# Patient Record
Sex: MALE | Race: WHITE | NOT HISPANIC OR LATINO | Employment: STUDENT | URBAN - METROPOLITAN AREA
[De-identification: names, ages, dates, MRNs, and addresses within clinical notes are randomized per-mention and may not be internally consistent; named-entity substitution may affect disease eponyms.]

---

## 2017-11-27 DIAGNOSIS — Z52.001 DONOR OF STEM CELL: Primary | ICD-10-CM

## 2017-11-29 ENCOUNTER — HOSPITAL ENCOUNTER (OUTPATIENT)
Dept: LAB | Facility: CLINIC | Age: 22
Discharge: HOME | End: 2017-11-29
Attending: INTERNAL MEDICINE | Admitting: INTERNAL MEDICINE
Payer: COMMERCIAL

## 2017-11-29 ENCOUNTER — OFFICE VISIT (OUTPATIENT)
Dept: TRANSPLANT | Facility: CLINIC | Age: 22
End: 2017-11-29
Attending: INTERNAL MEDICINE
Payer: COMMERCIAL

## 2017-11-29 VITALS
RESPIRATION RATE: 18 BRPM | WEIGHT: 135.8 LBS | BODY MASS INDEX: 20.11 KG/M2 | HEIGHT: 69 IN | SYSTOLIC BLOOD PRESSURE: 133 MMHG | OXYGEN SATURATION: 98 % | DIASTOLIC BLOOD PRESSURE: 71 MMHG | TEMPERATURE: 98.7 F | HEART RATE: 71 BPM

## 2017-11-29 VITALS
SYSTOLIC BLOOD PRESSURE: 108 MMHG | TEMPERATURE: 98.7 F | HEART RATE: 62 BPM | BODY MASS INDEX: 19.76 KG/M2 | HEIGHT: 69 IN | DIASTOLIC BLOOD PRESSURE: 66 MMHG | WEIGHT: 133.38 LBS | RESPIRATION RATE: 18 BRPM

## 2017-11-29 DIAGNOSIS — Z52.001 DONOR OF STEM CELL: ICD-10-CM

## 2017-11-29 DIAGNOSIS — Z52.001 DONOR OF STEM CELL: Primary | ICD-10-CM

## 2017-11-29 LAB
ABO + RH BLD: NORMAL
ABO + RH BLD: NORMAL
ALBUMIN SERPL-MCNC: 3.9 G/DL (ref 3.4–5)
ALBUMIN UR-MCNC: NEGATIVE MG/DL
ALP SERPL-CCNC: 85 U/L (ref 40–150)
ALT SERPL W P-5'-P-CCNC: 22 U/L (ref 0–70)
ANION GAP SERPL CALCULATED.3IONS-SCNC: 6 MMOL/L (ref 3–14)
APPEARANCE UR: CLEAR
APTT PPP: 29 SEC (ref 22–37)
AST SERPL W P-5'-P-CCNC: 22 U/L (ref 0–45)
BASOPHILS # BLD AUTO: 0 10E9/L (ref 0–0.2)
BASOPHILS NFR BLD AUTO: 0.7 %
BILIRUB SERPL-MCNC: 0.9 MG/DL (ref 0.2–1.3)
BILIRUB UR QL STRIP: NEGATIVE
BLD GP AB SCN SERPL QL: NORMAL
BLOOD BANK CMNT PATIENT-IMP: NORMAL
BUN SERPL-MCNC: 17 MG/DL (ref 7–30)
CALCIUM SERPL-MCNC: 9.2 MG/DL (ref 8.5–10.1)
CHLORIDE SERPL-SCNC: 104 MMOL/L (ref 94–109)
CO2 SERPL-SCNC: 29 MMOL/L (ref 20–32)
COLOR UR AUTO: YELLOW
CREAT SERPL-MCNC: 0.74 MG/DL (ref 0.66–1.25)
DIFFERENTIAL METHOD BLD: NORMAL
EOSINOPHIL # BLD AUTO: 0.1 10E9/L (ref 0–0.7)
EOSINOPHIL NFR BLD AUTO: 1.2 %
ERYTHROCYTE [DISTWIDTH] IN BLOOD BY AUTOMATED COUNT: 12.1 % (ref 10–15)
GFR SERPL CREATININE-BSD FRML MDRD: >90 ML/MIN/1.7M2
GLUCOSE SERPL-MCNC: 70 MG/DL (ref 70–99)
GLUCOSE UR STRIP-MCNC: NEGATIVE MG/DL
HCT VFR BLD AUTO: 45.9 % (ref 40–53)
HGB BLD-MCNC: 15.6 G/DL (ref 13.3–17.7)
HGB UR QL STRIP: NEGATIVE
IMM GRANULOCYTES # BLD: 0 10E9/L (ref 0–0.4)
IMM GRANULOCYTES NFR BLD: 0.2 %
INR PPP: 1.15 (ref 0.86–1.14)
KETONES UR STRIP-MCNC: NEGATIVE MG/DL
LDH SERPL L TO P-CCNC: 178 U/L (ref 85–227)
LEUKOCYTE ESTERASE UR QL STRIP: NEGATIVE
LYMPHOCYTES # BLD AUTO: 1.4 10E9/L (ref 0.8–5.3)
LYMPHOCYTES NFR BLD AUTO: 33.7 %
MCH RBC QN AUTO: 32.3 PG (ref 26.5–33)
MCHC RBC AUTO-ENTMCNC: 34 G/DL (ref 31.5–36.5)
MCV RBC AUTO: 95 FL (ref 78–100)
MONOCYTES # BLD AUTO: 0.4 10E9/L (ref 0–1.3)
MONOCYTES NFR BLD AUTO: 9 %
NEUTROPHILS # BLD AUTO: 2.3 10E9/L (ref 1.6–8.3)
NEUTROPHILS NFR BLD AUTO: 55.2 %
NITRATE UR QL: NEGATIVE
NRBC # BLD AUTO: 0 10*3/UL
NRBC BLD AUTO-RTO: 0 /100
PH UR STRIP: 6 PH (ref 5–7)
PLATELET # BLD AUTO: 236 10E9/L (ref 150–450)
POTASSIUM SERPL-SCNC: 4 MMOL/L (ref 3.4–5.3)
PROT SERPL-MCNC: 7.3 G/DL (ref 6.8–8.8)
RBC # BLD AUTO: 4.83 10E12/L (ref 4.4–5.9)
RBC #/AREA URNS AUTO: <1 /HPF (ref 0–2)
SODIUM SERPL-SCNC: 139 MMOL/L (ref 133–144)
SOURCE: NORMAL
SP GR UR STRIP: 1.02 (ref 1–1.03)
SPECIMEN EXP DATE BLD: NORMAL
UROBILINOGEN UR STRIP-MCNC: 0 MG/DL (ref 0–2)
WBC # BLD AUTO: 4.2 10E9/L (ref 4–11)
WBC #/AREA URNS AUTO: 1 /HPF (ref 0–2)

## 2017-11-29 PROCEDURE — 83615 LACTATE (LD) (LDH) ENZYME: CPT | Performed by: INTERNAL MEDICINE

## 2017-11-29 PROCEDURE — 83021 HEMOGLOBIN CHROMOTOGRAPHY: CPT | Performed by: INTERNAL MEDICINE

## 2017-11-29 PROCEDURE — 86901 BLOOD TYPING SEROLOGIC RH(D): CPT | Performed by: INTERNAL MEDICINE

## 2017-11-29 PROCEDURE — 85610 PROTHROMBIN TIME: CPT | Performed by: INTERNAL MEDICINE

## 2017-11-29 PROCEDURE — 85730 THROMBOPLASTIN TIME PARTIAL: CPT | Performed by: INTERNAL MEDICINE

## 2017-11-29 PROCEDURE — 86900 BLOOD TYPING SEROLOGIC ABO: CPT | Performed by: INTERNAL MEDICINE

## 2017-11-29 PROCEDURE — 36415 COLL VENOUS BLD VENIPUNCTURE: CPT

## 2017-11-29 PROCEDURE — 81001 URINALYSIS AUTO W/SCOPE: CPT | Performed by: INTERNAL MEDICINE

## 2017-11-29 PROCEDURE — 93010 ELECTROCARDIOGRAM REPORT: CPT | Mod: ZP | Performed by: INTERNAL MEDICINE

## 2017-11-29 PROCEDURE — 99212 OFFICE O/P EST SF 10 MIN: CPT | Mod: ZF

## 2017-11-29 PROCEDURE — 86850 RBC ANTIBODY SCREEN: CPT | Performed by: INTERNAL MEDICINE

## 2017-11-29 PROCEDURE — 85025 COMPLETE CBC W/AUTO DIFF WBC: CPT | Performed by: INTERNAL MEDICINE

## 2017-11-29 PROCEDURE — 80053 COMPREHEN METABOLIC PANEL: CPT | Performed by: INTERNAL MEDICINE

## 2017-11-29 RX ORDER — MULTIPLE VITAMINS W/ MINERALS TAB 9MG-400MCG
1 TAB ORAL DAILY
COMMUNITY

## 2017-11-29 ASSESSMENT — PAIN SCALES - GENERAL: PAINLEVEL: NO PAIN (0)

## 2017-11-29 NOTE — NURSING NOTE
"Oncology Rooming Note    November 29, 2017 11:30 AM   Rob Downs is a 21 year old male who presents for:    Chief Complaint   Patient presents with     Blood Draw     labs drawn from left arm via venipuncture and vitals taken by CMA      RECHECK     Donor here for labs and provider     Initial Vitals: /71 (BP Location: Left arm, Patient Position: Chair, Cuff Size: Adult Regular)  Pulse 71  Temp 98.7  F (37.1  C) (Oral)  Resp 18  Ht 1.755 m (5' 9.09\")  Wt 61.6 kg (135 lb 12.8 oz)  SpO2 98%  BMI 20 kg/m2 Estimated body mass index is 20 kg/(m^2) as calculated from the following:    Height as of this encounter: 1.755 m (5' 9.09\").    Weight as of this encounter: 61.6 kg (135 lb 12.8 oz). Body surface area is 1.73 meters squared.  No Pain (0) Comment: Data Unavailable   No LMP for male patient.  Allergies reviewed: Yes  Medications reviewed: Yes    Medications: Medication refills not needed today.  Pharmacy name entered into EPIC: Data Unavailable    Clinical concerns: NA NA was NOT notified.    0 minutes for nursing intake (face to face time)     Kita Ortiz CMA              "

## 2017-11-29 NOTE — MR AVS SNAPSHOT
"              After Visit Summary   11/29/2017    Rob Downs    MRN: [id number]           Patient Information     Date Of Birth          1995        Visit Information        Provider Department      11/29/2017 10:00 AM Coordinator, Audi Bmt Nurse;  2 118 CONSULT RM [facility] Blood and Marrow Transplant        Today's Diagnoses     Donor of stem cell    -  1          [facility] ([facility] [place])  [address] [place] [address]  Phone: [phone]  Clinic Hours:   Monday-Thursday:7am to 7pm   Friday: 7am to 5pm   Weekends and holidays:    8am to noon (in general)  If your fever is 100.5  or greater,   call the clinic.  After hours call the   hospital at [phone] or   [phone]. Ask for the BMT   fellow on-call            Follow-ups after your visit        Who to contact     If you have questions or need follow up information about today's clinic visit or your schedule please contact [facility] BLOOD AND MARROW TRANSPLANT directly at [phone].  Normal or non-critical lab and imaging results will be communicated to you by ParLevel Systemshart, letter or phone within 4 business days after the clinic has received the results. If you do not hear from us within 7 days, please contact the clinic through [employer] or phone. If you have a critical or abnormal lab result, we will notify you by phone as soon as possible.  Submit refill requests through [employer] or call your pharmacy and they will forward the refill request to us. Please allow 3 business days for your refill to be completed.          Additional Information About Your Visit        ParLevel Systemshart Information     [employer] lets you send messages to your doctor, view your test results, renew your prescriptions, schedule appointments and more. To sign up, go to [email or web address] . Click on \"Log in\" on the left side of the screen, which will take you to the Welcome page. Then click on \"Sign up Now\" on the right side of the page.     You will be " asked to enter the access code listed below, as well as some personal information. Please follow the directions to create your username and password.     Your access code is: [email or web address]  Expires: 2018  6:30 AM     Your access code will  in 90 days. If you need help or a new code, please call your [place] clinic or [phone].        Care EveryWhere ID     This is your Care EveryWhere ID. This could be used by other organizations to access your [place] medical records  [id number]         Blood Pressure from Last 3 Encounters:   17 133/71   17 108/66    Weight from Last 3 Encounters:   17 61.6 kg (135 lb 12.8 oz)   17 60.5 kg (133 lb 6.1 oz)              Today, you had the following     No orders found for display       Recent Review Flowsheet Data     BMT Recent Results Latest Ref Rng & Units 2017    WBC 4.0 - 11.0 10e9/L 4.2    Hemoglobin 13.3 - 17.7 g/dL 15.6    Platelet Count 150 - 450 10e9/L 236    Neutrophils (Absolute) 1.6 - 8.3 10e9/L 2.3    INR 0.86 - 1.14 1.15(H)    Sodium 133 - 144 mmol/L 139    Potassium 3.4 - 5.3 mmol/L 4.0    Chloride 94 - 109 mmol/L 104    Glucose 70 - 99 mg/dL 70    Urea Nitrogen 7 - 30 mg/dL 17    Creatinine 0.66 - 1.25 mg/dL 0.74    Calcium (Total) 8.5 - 10.1 mg/dL 9.2    Protein (Total) 6.8 - 8.8 g/dL 7.3    Albumin 3.4 - 5.0 g/dL 3.9    Alkaline Phosphatase 40 - 150 U/L 85    AST 0 - 45 U/L 22    ALT 0 - 70 U/L 22    MCV 78 - 100 fl 95               Primary Care Provider Fax #    Physician No Ref-Primary [phone]       No address on file        Equal Access to Services     DESMOND FIGUEROA AH: Emilie Bhagat, chad mc, liliya kan ah. So [facility] [phone].    ATENCIÓN: Si habla español, tiene a adam disposición servicios gratuitos de asistencia lingüística. Llame al [phone].    We comply with applicable federal civil rights laws and [place] laws. We do not  discriminate on the basis of race, color, national origin, age, disability, sex, sexual orientation, or gender identity.            Thank you!     Thank you for choosing [facility] BLOOD AND MARROW TRANSPLANT  for your care. Our goal is always to provide you with excellent care. Hearing back from our patients is one way we can continue to improve our services. Please take a few minutes to complete the written survey that you may receive in the mail after your visit with us. Thank you!             Your Updated Medication List - Protect others around you: Learn how to safely use, store and throw away your medicines at www.disposemymeds.org.          This list is accurate as of: 11/29/17 11:59 PM.  Always use your most recent med list.                   Brand Name Dispense Instructions for use Diagnosis    Multi-vitamin Tabs tablet      Take 1 tablet by mouth daily

## 2017-11-29 NOTE — PROGRESS NOTES
Chief Complaint   Patient presents with     RECHECK     Donor here for EKG       Kita Ortiz CMA November 29, 2017 11:27 AM

## 2017-11-29 NOTE — NURSING NOTE
Chief Complaint   Patient presents with     Blood Draw     labs drawn from left arm via venipuncture and vitals taken by BEATIRS Diaz CMA November 29, 2017

## 2017-11-29 NOTE — PROGRESS NOTES
BMT Donor History and Physical    Rob Downs MRN# [id number]   Age: 21 year old YOB: 1995            Assessment and Plan   21yr old healthy male cleared for stem cell donation pending ID markers.    *of note pt mentions a L testicular lump he has noted since age 10 unchanged. He has not had evaluation by medical provider. Based on his hx discussed with Dr. Benavidez no further w/u required at this time for stem cell donation. Rec he can discuss with his PCP with possible imaging just to close loop on dx.      HPI: No major medical complaints today.   Transfusions: No  Hepatitis: No  Currently pregnant or any chance may be pregnant: Not applicable  Currently breast feeding: Not applicable  Currently using a method of birth control: No Not applicable: Patient is male  Number of previous pregnancies: Not applicable  Alcohol use: No  Tobacco use: No  Recreational drugs: No  Nonmedical percutaneous drug use: No  Recent immunizations or planning on getting any immunizations: No  Ear or body piercing in the last 12 months: No  New tattoo in recent 12 months:No  History of cancer or blood disease: No  Known risk factors: Monogamous homosexual relationship        Past Medical History:   This patient has no significant past medical history other then noted above         Past Surgical History:    This patient has no significant past surgical history         Social History:   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 He is not . No children. In a monogamous homosexual relationship.          Family History:   This patient has no significant family history         Immunizations:   Immunizations are up to date         Allergies:   All allergies reviewed and addressed         Medications:     Current Outpatient Prescriptions   Medication Sig     multivitamin, therapeutic with minerals (MULTI-VITAMIN) TABS tablet Take 1 tablet by mouth daily     No current  facility-administered medications for this visit.             Review of Systems:   The Review of Systems is negative other than noted in the HPI         Physical Exam:   All vitals stable  Constitutional:   awake, alert, cooperative, no apparent distress, and appears stated age     Eyes:   Lids and lashes normal, pupils equal, round and reactive to light, extra ocular muscles intact, sclera clear, conjunctiva normal     ENT:   normocepalic, without obvious abnormality     Hematologic / Lymphatic:   no cervical lymphadenopathy     Lungs:   No increased work of breathing, good air exchange, clear to auscultation bilaterally, no crackles or wheezing     Cardiovascular:   Normal apical impulse, regular rate and rhythm, normal S1 and S2, no S3 or S4, and no murmur noted     Abdomen:   No scars, normal bowel sounds, soft, non-distended, non-tender, no masses palpated, no hepatosplenomegally    : Normal male genitalia. No varicocele or hernia present. +firm, mobile, dime sized mass directly superior to L teste      Musculoskeletal:   no lower extremity pitting edema present  motor strength is 5 out of 5 all extremities bilaterally     Neurologic:   No focal deficits.      Skin:   no rashes          Data:   All laboratory data reviewed  All cardiac studies reviewed by me.  All imaging studies reviewed by me.   Cinda Lal PA-C

## 2017-11-29 NOTE — MR AVS SNAPSHOT
"              After Visit Summary   11/29/2017    Rob Downs    MRN: [id number]           Patient Information     Date Of Birth          1995        Visit Information        Provider Department      11/29/2017 11:00 AM  BMT EPIFANIO #2 [facility] Blood and Marrow Transplant        Today's Diagnoses     Donor of stem cell              [facility] ([facility] [place]  [address] [place] [address]  Phone: [phone]  Clinic Hours:   Monday-Thursday:7am to 7pm   Friday: 7am to 5pm   Weekends and holidays:    8am to noon (in general)  If your fever is 100.5  or greater,   call the clinic.  After hours call the   hospital at [phone] or   [phone]. Ask for the BMT   fellow on-call            Follow-ups after your visit        Who to contact     If you have questions or need follow up information about today's clinic visit or your schedule please contact [facility] BLOOD AND MARROW TRANSPLANT directly at [phone].  Normal or non-critical lab and imaging results will be communicated to you by [employer], letter or phone within 4 business days after the clinic has received the results. If you do not hear from us within 7 days, please contact the clinic through [employer] or phone. If you have a critical or abnormal lab result, we will notify you by phone as soon as possible.  Submit refill requests through [employer] or call your pharmacy and they will forward the refill request to us. Please allow 3 business days for your refill to be completed.          Additional Information About Your Visit        [employer] Information     [employer] lets you send messages to your doctor, view your test results, renew your prescriptions, schedule appointments and more. To sign up, go to [email or web address] . Click on \"Log in\" on the left side of the screen, which will take you to the Welcome page. Then click on \"Sign up Now\" on the right side of the page.     You will be asked to enter the access code listed " "below, as well as some personal information. Please follow the directions to create your username and password.     Your access code is: [email or web address]  Expires: 2018  6:30 AM     Your access code will  in 90 days. If you need help or a new code, please call your [facility] or [phone].        Care EveryWhere ID     This is your Care EveryWhere ID. This could be used by other organizations to access your [place] medical records  [id number]        Your Vitals Were     Pulse Temperature Respirations Height Pulse Oximetry BMI (Body Mass Index)    71 98.7  F (37.1  C) (Oral) 18 1.755 m (5' 9.09\") 98% 20 kg/m2       Blood Pressure from Last 3 Encounters:   17 133/71   17 108/66    Weight from Last 3 Encounters:   17 61.6 kg (135 lb 12.8 oz)   17 60.5 kg (133 lb 6.1 oz)              We Performed the Following     ABO/Rh type and screen     CBC with platelets differential     Comprehensive metabolic panel     Hemoglobin S     INR     Lactate Dehydrogenase     Partial thromboplastin time     Routine UA with microscopic        Recent Review Flowsheet Data     BMT Recent Results Latest Ref Rng & Units 2017    WBC 4.0 - 11.0 10e9/L 4.2    Hemoglobin 13.3 - 17.7 g/dL 15.6    Platelet Count 150 - 450 10e9/L 236    Neutrophils (Absolute) 1.6 - 8.3 10e9/L 2.3    INR 0.86 - 1.14 1.15(H)    Sodium 133 - 144 mmol/L 139    Potassium 3.4 - 5.3 mmol/L 4.0    Chloride 94 - 109 mmol/L 104    Glucose 70 - 99 mg/dL 70    Urea Nitrogen 7 - 30 mg/dL 17    Creatinine 0.66 - 1.25 mg/dL 0.74    Calcium (Total) 8.5 - 10.1 mg/dL 9.2    Protein (Total) 6.8 - 8.8 g/dL 7.3    Albumin 3.4 - 5.0 g/dL 3.9    Alkaline Phosphatase 40 - 150 U/L 85    AST 0 - 45 U/L 22    ALT 0 - 70 U/L 22    MCV 78 - 100 fl 95               Primary Care Provider Fax #    Physician No Ref-Primary [phone]       No address on file        Equal Access to Services     DESMOND GONZALEZ: chad Cross " david mcmaadolfo garciaguerreroadolfo liliya quinnvera hoffman. So [facility] [place] [facility] [phone].    ATENCIÓN: Si fede cárdenas, tiene a adam disposición servicios gratuitos de asistencia lingüística. Llame al [phone].    We comply with applicable federal civil rights laws and [place] laws. We do not discriminate on the basis of race, color, national origin, age, disability, sex, sexual orientation, or gender identity.            Thank you!     Thank you for choosing [facility] BLOOD AND MARROW TRANSPLANT  for your care. Our goal is always to provide you with excellent care. Hearing back from our patients is one way we can continue to improve our services. Please take a few minutes to complete the written survey that you may receive in the mail after your visit with us. Thank you!             Your Updated Medication List - Protect others around you: Learn how to safely use, store and throw away your medicines at www.disposemymeds.org.          This list is accurate as of: 11/29/17 12:01 PM.  Always use your most recent med list.                   Brand Name Dispense Instructions for use Diagnosis    Multi-vitamin Tabs tablet      Take 1 tablet by mouth daily

## 2017-11-29 NOTE — CONSULTS
Laboratory Medicine and Pathology  Transfusion Medicine - Apheresis Procedure: Peripheral Hematopoietic Stem Cell Collection    Rob Downs MRN# [id number]   YOB: 1995 Age: 21 year old   Date of Admission: 11/29/2017     [facility]           Assessment and Plan:   Mr. Downs is 21 year old who presents for consultation to collect peripheral hematopoietic stem for the [employer] Marrow Donor Program (DODMDP) after being identified through the National Marrow Donor Program (NMDP).  The plan is to collect for 1 to 3 days or until the target goal is met. He does have adequate veins and will not require line placement and he will get filgrastin injection for stimulation of bone marrow.           Chief Complaint:   Transfusion medicine consultation.          History of Present Illness:   This 21 year old male donor candidate reports being in a good state of health with no current complaints.  His past medical history is unremarkable other than removal of thyroglossal duct cyst. He exercises regularly and specifically denies any back or joint pain that would prevent him from tolerating the procedure.  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  He did confirm that he is homosexual and male-male sex was discussed as a potential donation risk factor. The transplant center (DOD coordinator Alison Gore) was aware of this risk factor before sending him for consultation. This information was also given by us to the BMT team (Dr. Benavidez and Rochelle Cleaning)    The procedure, risks/benefits were discussed with the patient and all of his questions were answered.  Attestation: This patient has been seen and evaluated by me, David Enriquez.             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           Allergies:   None, although he does have concerns of possible reactions to morphine in family members.           Medications:   None.               Review of Systems:    General: Denies fever, chills, fatigue, weakness.  Head: Negative for headache  Eyes: Negative for blurring of vision.  Ears: Denies hearing loss or infection.  Throat: Negative for pain.  Respiratory: No cough or shortness of breath.  Cardiac: No chest pain or palpitations.   GI: No nausea, vomiting, diarrhea, or constipation.  Urinary: No dysuria, hematuria.  Musculoskeletal: No joint or back pain other that occasional leg soreness from running.  Neuro: Negative for seizures.  Hematologic: Denies easy bruising or bleeding.  Endocrine: Negative for heat, cold intolerance.  Skin: No itching, rashes, or lesions.          Data:     Lab Results   Component Value Date    WBC 4.2 11/29/2017    HGB 15.6 11/29/2017    HCT 45.9 11/29/2017     11/29/2017     11/29/2017    POTASSIUM 4.0 11/29/2017    CHLORIDE 104 11/29/2017    CO2 29 11/29/2017    BUN 17 11/29/2017    CR 0.74 11/29/2017    GLC 70 11/29/2017    AST 22 11/29/2017    ALT 22 11/29/2017    ALKPHOS 85 11/29/2017    BILITOTAL 0.9 11/29/2017    INR 1.15 (H) 11/29/2017     Attestation: This patient has been seen and evaluated by me, David Enriquez.

## 2017-11-29 NOTE — MR AVS SNAPSHOT
"              After Visit Summary   11/29/2017    Rob Downs    MRN: [id number]           Patient Information     Date Of Birth          1995        Visit Information        Provider Department      11/29/2017 8:30 AM 1,  Bmt Nurse [facility] Blood and Marrow Transplant        Today's Diagnoses     Donor of stem cell              [facility] [place] [place]  [address] [place] [address]  Phone: [phone]  Clinic Hours:   Monday-Thursday:7am to 7pm   Friday: 7am to 5pm   Weekends and holidays:    8am to noon (in general)  If your fever is 100.5  or greater,   call the clinic.  After hours call the   hospital at [phone] or   [phone]. Ask for the BMT   fellow on-call            Follow-ups after your visit        Who to contact     If you have questions or need follow up information about today's clinic visit or your schedule please contact [facility] BLOOD AND MARROW TRANSPLANT directly at [phone].  Normal or non-critical lab and imaging results will be communicated to you by [employer]art, letter or phone within 4 business days after the clinic has received the results. If you do not hear from us within 7 days, please contact the clinic through [employer] or phone. If you have a critical or abnormal lab result, we will notify you by phone as soon as possible.  Submit refill requests through [employer] or call your pharmacy and they will forward the refill request to us. Please allow 3 business days for your refill to be completed.          Additional Information About Your Visit        [employer] Information     [employer] lets you send messages to your doctor, view your test results, renew your prescriptions, schedule appointments and more. To sign up, go to [email or web address] . Click on \"Log in\" on the left side of the screen, which will take you to the Welcome page. Then click on \"Sign up Now\" on the right side of the page.     You will be asked to enter the access code listed " below, as well as some personal information. Please follow the directions to create your username and password.     Your access code is: [email or web address]  Expires: 2018  6:30 AM     Your access code will  in 90 days. If you need help or a new code, please call your [place] clinic or [phone].        Care EveryWhere ID     This is your Care EveryWhere ID. This could be used by other organizations to access your [place] medical records  [id number]         Blood Pressure from Last 3 Encounters:   17 133/71   17 108/66    Weight from Last 3 Encounters:   17 61.6 kg (135 lb 12.8 oz)   17 60.5 kg (133 lb 6.1 oz)              We Performed the Following     EKG 12-lead complete w/read - Clinics        Recent Review Flowsheet Data     BMT Recent Results Latest Ref Rng & Units 2017    WBC 4.0 - 11.0 10e9/L 4.2    Hemoglobin 13.3 - 17.7 g/dL 15.6    Platelet Count 150 - 450 10e9/L 236    Neutrophils (Absolute) 1.6 - 8.3 10e9/L 2.3    INR 0.86 - 1.14 1.15(H)    Sodium 133 - 144 mmol/L 139    Potassium 3.4 - 5.3 mmol/L 4.0    Chloride 94 - 109 mmol/L 104    Glucose 70 - 99 mg/dL 70    Urea Nitrogen 7 - 30 mg/dL 17    Creatinine 0.66 - 1.25 mg/dL 0.74    Calcium (Total) 8.5 - 10.1 mg/dL 9.2    Protein (Total) 6.8 - 8.8 g/dL 7.3    Albumin 3.4 - 5.0 g/dL 3.9    Alkaline Phosphatase 40 - 150 U/L 85    AST 0 - 45 U/L 22    ALT 0 - 70 U/L 22    MCV 78 - 100 fl 95               Primary Care Provider Fax #    Physician No Ref-Primary [phone]       No address on file        Equal Access to Services     DESMOND FIGUEROA : Emilie Bhagat, watristan mc, qaybestefani kaalliliya santiago. So [facility] [phone].    ATENCIÓN: Si habla español, tiene a adam disposición servicios gratuitos de asistencia lingüística. Llame al [phone].    We comply with applicable federal civil rights laws and [place] laws. We do not discriminate on the basis of  race, color, national origin, age, disability, sex, sexual orientation, or gender identity.            Thank you!     Thank you for choosing [facility] BLOOD AND MARROW TRANSPLANT  for your care. Our goal is always to provide you with excellent care. Hearing back from our patients is one way we can continue to improve our services. Please take a few minutes to complete the written survey that you may receive in the mail after your visit with us. Thank you!             Your Updated Medication List - Protect others around you: Learn how to safely use, store and throw away your medicines at www.disposemymeds.org.          This list is accurate as of: 11/29/17 11:28 AM.  Always use your most recent med list.                   Brand Name Dispense Instructions for use Diagnosis    Multi-vitamin Tabs tablet      Take 1 tablet by mouth daily

## 2017-11-29 NOTE — PROGRESS NOTES
"APHERESIS INITIAL CONSULT CHECKLIST    Current Encounter Information  Current Encounter Information: Reason for Visit, Allergies and Current Meds  Procedure Requested: MNC/PBSC Collection (NMDP)  History of: (Reason for Apheresis): healthy donor    Access Assessment  Access Assessment  Vein Assessment:  Veins are adequate: Yes  Needs a catheter placed for Apheresis?: No    Vital Signs  Vital Signs  BP: 108/66  Pulse: 62  Temp: 98.7  F (37.1  C)  Temp src: Oral  Resp: 18  Height: 175.5 cm (5' 9.09\") (no shoes)  Weight: 60.5 kg (133 lb 6.1 oz) (no shoes)    Reviewed   Review With Patient  Have you read the brochure Getting ready for Apheresis?: Yes  Have you had any invasive procedures, surgery, biopsy, bleeding in the last month?: No  Review medications and allergies: Yes  Have you ever been transfused?: No  Do you require pre-medication for blood products?: No  Patient given tour of the unit: Yes  Photophoresis: sun precautions reviewed with patient: N/A    Additional Information  Notes, needs and time spent with patient  Explain procedure, side effects or reactions, instructions: Yes  Patient has special need?: No  Time spent: 25 minutes  Face to face time 25 minutes reviewing medical history, collecting vital signs, assessing veins for adequate access, discussed procedure, side effects, diet restrictions, water intake, and encouraged him to wear comfortable clothing. Dr. Enriquez in to complete consent following consult.      "

## 2017-11-29 NOTE — NURSING NOTE
Chief Complaint   Patient presents with     RECHECK     Donor here for EKG       Kita Ortiz CMA November 29, 2017 11:26 AM  AN EKG was done on the patient.

## 2017-11-30 LAB — LAB SCANNED RESULT: NORMAL

## 2017-12-01 LAB — INTERPRETATION ECG - MUSE: NORMAL

## 2017-12-01 NOTE — PROGRESS NOTES
Met with donor and discussed plan for visit. Donor will undergo a PBSC collection if cleared to donate. Discussed process and approximate timeline to clearance. Donor states that he has reviewed the information provided by donor services at [facility] regarding the process.  All remaining questions answered.

## 2017-12-08 PROBLEM — Z52.001 DONOR OF STEM CELL: Status: ACTIVE | Noted: 2017-12-08

## 2017-12-15 DIAGNOSIS — Z52.001 STEM CELL DONOR: Primary | ICD-10-CM

## 2017-12-18 ENCOUNTER — OFFICE VISIT (OUTPATIENT)
Dept: TRANSPLANT | Facility: CLINIC | Age: 22
End: 2017-12-18
Attending: PHYSICIAN ASSISTANT
Payer: COMMERCIAL

## 2017-12-18 ENCOUNTER — HOSPITAL ENCOUNTER (OUTPATIENT)
Dept: LAB | Facility: CLINIC | Age: 22
Discharge: HOME | End: 2017-12-18
Attending: INTERNAL MEDICINE | Admitting: INTERNAL MEDICINE
Payer: COMMERCIAL

## 2017-12-18 VITALS
WEIGHT: 133.16 LBS | DIASTOLIC BLOOD PRESSURE: 61 MMHG | BODY MASS INDEX: 19.61 KG/M2 | SYSTOLIC BLOOD PRESSURE: 123 MMHG | HEART RATE: 63 BPM | TEMPERATURE: 99.4 F | RESPIRATION RATE: 20 BRPM

## 2017-12-18 DIAGNOSIS — Z52.001 DONOR OF STEM CELL: ICD-10-CM

## 2017-12-18 DIAGNOSIS — Z52.001 DONOR OF STEM CELL: Primary | ICD-10-CM

## 2017-12-18 LAB
ABO + RH BLD: NORMAL
ABO + RH BLD: NORMAL
ASR COMMENT: NORMAL
BASOPHILS # BLD AUTO: 0 10E9/L (ref 0–0.2)
BASOPHILS # BLD AUTO: 0 10E9/L (ref 0–0.2)
BASOPHILS NFR BLD AUTO: 0 %
BASOPHILS NFR BLD AUTO: 0 %
BLD GP AB SCN SERPL QL: NORMAL
BLOOD BANK CMNT PATIENT-IMP: NORMAL
CD34 PROGEN CELLS: 0.77 %
CD34 STEM CELL ASSAY INTERP: NORMAL
CELL THERAPY PRODUCT NUMBER: NORMAL
DIFFERENTIAL METHOD BLD: ABNORMAL
DIFFERENTIAL METHOD BLD: ABNORMAL
EOSINOPHIL # BLD AUTO: 0.3 10E9/L (ref 0–0.7)
EOSINOPHIL # BLD AUTO: 1.2 10E9/L (ref 0–0.7)
EOSINOPHIL NFR BLD AUTO: 0.9 %
EOSINOPHIL NFR BLD AUTO: 4.4 %
ERYTHROCYTE [DISTWIDTH] IN BLOOD BY AUTOMATED COUNT: 12.6 % (ref 10–15)
ERYTHROCYTE [DISTWIDTH] IN BLOOD BY AUTOMATED COUNT: 12.7 % (ref 10–15)
HCT VFR BLD AUTO: 40.3 % (ref 40–53)
HCT VFR BLD AUTO: 44 % (ref 40–53)
HGB BLD-MCNC: 14 G/DL (ref 13.3–17.7)
HGB BLD-MCNC: 15 G/DL (ref 13.3–17.7)
IFC SPECIMEN: NORMAL
LYMPHOCYTES # BLD AUTO: 1.2 10E9/L (ref 0.8–5.3)
LYMPHOCYTES # BLD AUTO: 1.7 10E9/L (ref 0.8–5.3)
LYMPHOCYTES NFR BLD AUTO: 4.4 %
LYMPHOCYTES NFR BLD AUTO: 6.1 %
MCH RBC QN AUTO: 32.8 PG (ref 26.5–33)
MCH RBC QN AUTO: 32.8 PG (ref 26.5–33)
MCHC RBC AUTO-ENTMCNC: 34.1 G/DL (ref 31.5–36.5)
MCHC RBC AUTO-ENTMCNC: 34.7 G/DL (ref 31.5–36.5)
MCV RBC AUTO: 94 FL (ref 78–100)
MCV RBC AUTO: 96 FL (ref 78–100)
METAMYELOCYTES # BLD: 0.2 10E9/L
METAMYELOCYTES NFR BLD MANUAL: 0.9 %
MONOCYTES # BLD AUTO: 0.7 10E9/L (ref 0–1.3)
MONOCYTES # BLD AUTO: 1.5 10E9/L (ref 0–1.3)
MONOCYTES NFR BLD AUTO: 2.6 %
MONOCYTES NFR BLD AUTO: 5.3 %
MYELOCYTES # BLD: 0.2 10E9/L
MYELOCYTES # BLD: 1 10E9/L
MYELOCYTES NFR BLD MANUAL: 0.9 %
MYELOCYTES NFR BLD MANUAL: 3.5 %
NEUTROPHILS # BLD AUTO: 22.8 10E9/L (ref 1.6–8.3)
NEUTROPHILS # BLD AUTO: 23.5 10E9/L (ref 1.6–8.3)
NEUTROPHILS NFR BLD AUTO: 84.2 %
NEUTROPHILS NFR BLD AUTO: 86.8 %
PLATELET # BLD AUTO: 147 10E9/L (ref 150–450)
PLATELET # BLD AUTO: 194 10E9/L (ref 150–450)
PLATELET # BLD EST: ABNORMAL 10*3/UL
PLATELET # BLD EST: ABNORMAL 10*3/UL
RBC # BLD AUTO: 4.27 10E12/L (ref 4.4–5.9)
RBC # BLD AUTO: 4.58 10E12/L (ref 4.4–5.9)
RBC MORPH BLD: NORMAL
RBC MORPH BLD: NORMAL
SPECIMEN EXP DATE BLD: NORMAL
WBC # BLD AUTO: 26.3 10E9/L (ref 4–11)
WBC # BLD AUTO: 27.9 10E9/L (ref 4–11)

## 2017-12-18 PROCEDURE — 85025 COMPLETE CBC W/AUTO DIFF WBC: CPT | Performed by: PATHOLOGY

## 2017-12-18 PROCEDURE — 25000125 ZZHC RX 250: Mod: ZF | Performed by: PATHOLOGY

## 2017-12-18 PROCEDURE — 25000132 ZZH RX MED GY IP 250 OP 250 PS 637: Mod: ZF | Performed by: PATHOLOGY

## 2017-12-18 PROCEDURE — 88184 FLOWCYTOMETRY/ TC 1 MARKER: CPT | Performed by: PATHOLOGY

## 2017-12-18 PROCEDURE — 86850 RBC ANTIBODY SCREEN: CPT | Performed by: PATHOLOGY

## 2017-12-18 PROCEDURE — 25000128 H RX IP 250 OP 636: Mod: ZF | Performed by: PATHOLOGY

## 2017-12-18 PROCEDURE — 86900 BLOOD TYPING SEROLOGIC ABO: CPT | Performed by: PATHOLOGY

## 2017-12-18 PROCEDURE — 40000141 ZZH STATISTIC PERIPHERAL IV START W/O US GUIDANCE: Mod: ZF

## 2017-12-18 PROCEDURE — 38214 VOLUME DEPLETE OF HARVEST: CPT | Performed by: INTERNAL MEDICINE

## 2017-12-18 PROCEDURE — 86901 BLOOD TYPING SEROLOGIC RH(D): CPT | Performed by: PATHOLOGY

## 2017-12-18 PROCEDURE — 38205 HARVEST ALLOGENEIC STEM CELL: CPT | Mod: ZF

## 2017-12-18 RX ORDER — ACETAMINOPHEN 325 MG/1
650 TABLET ORAL ONCE
Status: COMPLETED | OUTPATIENT
Start: 2017-12-18 | End: 2017-12-18

## 2017-12-18 RX ADMIN — CALCIUM GLUCONATE 1208 MG/HR: 94 INJECTION, SOLUTION INTRAVENOUS at 12:32

## 2017-12-18 RX ADMIN — ANTICOAGULANT CITRATE DEXTROSE SOLUTION FORMULA A 1974 ML: 12.25; 11; 3.65 SOLUTION INTRAVENOUS at 12:29

## 2017-12-18 RX ADMIN — ACETAMINOPHEN 650 MG: 325 TABLET ORAL at 14:41

## 2017-12-18 NOTE — PROGRESS NOTES
Progress Note  12/18/17    Patient ID: 21 year old male undergoing unrelated donor autologous PBSCT collection for NMDP.     Interval History: Endorses having some side effects from GCSF yesterday including back pain, nausea, and small amount of vomiting. Nausea and vomiting has subsided but back pain continues intermittently. He has been taking acetaminophen PRN for this. He has been tolerating donation well thus far with the exception of mild lower left rib pain that has since subsided.  No infectious symptoms today.     Physical Exam:   There were no vitals taken for this visit.  General: Laying in bed, in NAD  Eyes: sclera non-injected, anicteric  Heart: RRR without murmurs rubs or gallops  Lungs: CTA bilaterally without rales, rhonchi, or wheezes.   Abdomen: NT, ND, BS present x4    Labs:   Lab Results   Component Value Date    WBC 27.9 (H) 12/18/2017    HGB 15.0 12/18/2017    HCT 44.0 12/18/2017     12/18/2017     11/29/2017    POTASSIUM 4.0 11/29/2017    CHLORIDE 104 11/29/2017    CR 0.74 11/29/2017    BUN 17 11/29/2017    CO2 29 11/29/2017    INR 1.15 (H) 11/29/2017       Assessment/Plan:   21 year old male undergoing unrelated donor autologous PBSCT collection for NMDP.     1. Stem cell collection: Patient will undergo one time collection today for donation to the [facility] for 27 year old recipient with AML.     2. Bone pain: Can take acetaminophen or Claritin for bone pain related to GCSF. Bone pain should subside in 4-5 days.     3. Supportive: Encouraged patient to take it easy for a few days following donation, as he will likely be fatigued. Regarding physical activity, suggested patient give himself a day or two before easing back into rigorous exercise.     Patient is to f/u with nurse coordinator Rochelle LONG after donation. He can be seen in clinic if he has any concerns related to donation or prolonged symptoms from GCSF.

## 2017-12-18 NOTE — IP AVS SNAPSHOT
[facility]    [address] [address]    Phone:  [phone]    Fax:  [phone]                                       After Visit Summary   12/18/2017    Rob Downs    MRN: [id number]           After Visit Summary Signature Page     I have received my discharge instructions, and my questions have been answered. I have discussed any challenges I see with this plan with the nurse or doctor.    ..........................................................................................................................................  Patient/Patient Representative Signature      ..........................................................................................................................................  Patient Representative Print Name and Relationship to Patient    ..................................................               ................................................  Date                                            Time    ..........................................................................................................................................  Reviewed by Signature/Title    ...................................................              ..............................................  Date                                                            Time

## 2017-12-18 NOTE — IP AVS SNAPSHOT
MRN:[id number]                      After Visit Summary   12/18/2017    Rob Downs    MRN: [id number]           Thank you!     Thank you for choosing [facility] for your care. Our goal is always to provide you with excellent care. Hearing back from our patients is one way we can continue to improve our services. Please take a few minutes to complete the written survey that you may receive in the mail after you visit with us. Thank you!        Patient Information     Date Of Birth          1995        About your hospital stay     You were admitted on:  December 18, 2017 You last received care in the:  [facility] Apheresis    You were discharged on:  December 18, 2017       Who to Call     For medical emergencies, please call 911.  For non-urgent questions about your medical care, please call your primary care provider or clinic, None          Attending Provider     Provider Specialty    Sebastián Lopez MD Hematology       Primary Care Provider Fax #    Physician No Ref-Primary [phone]      Your next 10 appointments already scheduled     Dec 18, 2017  2:30 PM CST   Unrelated Donor Visit with  BMT EPIFANIO #4   [facility] Blood and Marrow Transplant ([facility])    [address] [place] [address] [phone]              Further instructions from your care team       Allogeneic Donor Collection:  Sometimes following the procedure, your blood platelet count may be low.  If you are told your platelet count is low, you need to avoid taking aspirin/aspirin containing products and avoid heavy physical activity and activities that may result in bruising or traumatic injury.  Related donors will receive a call from a Bone Marrow Transplant Coordinator who will tell you if you need to return for an additional collection.  Remember to follow a low-fat diet if you will need another collection.  To contact the BMT  "fellow or attending physician after 5 p.m. call [phone].    Apheresis Blood Donor Center Post Instructions  You may feel tired after your procedure today.   Please call your doctor if you have:  bleeding that doesn t stop, fever, pain where a needle or tube (catheter) was placed, seizures, trouble breathing, red urine, nausea or vomiting, other health concerns.     If your symptoms are severe, call 911.  If your veins were used, keep the bandages on for 2-4 hours.  Avoid heavy lifting with your arms.  If bleeding occurs from these sites, apply firm pressure for 5-10 minutes.  Call your physician if bleeding continues.    The Apheresis/Blood Donor Center is open Monday-Friday 7:30 a.m. to 5 p.m.  The phone number is [phone].  A Transfusion Medicine physician can be reached after 5:00 p.m. weekdays and on weekends /Holidays by calling [phone], and asking for the physician on call.          Pending Results     No orders found from 12/16/2017 to 12/19/2017.            Admission Information     Date & Time Provider Department Dept. Phone    12/18/2017 Sebastián Lopez MD [facility] Apheresis [phone]      Your Vitals Were     Blood Pressure Pulse Temperature Respirations Weight BMI (Body Mass Index)    107/52 66 98  F (36.7  C) (Oral) 20 60.4 kg (133 lb 2.5 oz) 19.61 kg/m2      [employer] Information     [employer] lets you send messages to your doctor, view your test results, renew your prescriptions, schedule appointments and more. To sign up, go to [email or web address] . Click on \"Log in\" on the left side of the screen, which will take you to the Welcome page. Then click on \"Sign up Now\" on the right side of the page.     You will be asked to enter the access code listed below, as well as some personal information. Please follow the directions to create your username and password.     Your access code is: [email or web address]  Expires: 2/26/2018  6:30 AM     Your access code will "  in 90 days. If you need help or a new code, please call your [facility] clinic or [phone].        Care EveryWhere ID     This is your Care EveryWhere ID. This could be used by other organizations to access your [facility] medical records  [id number]        Equal Access to Services     DESMOND FIGUEROA : Hadii aad ku hadasho Soomaali, waaxda luqadaha, qaybta kaalmada adeegyada, liliya cabrerarenaldopedro hoffman. So [facility] [phone].    ATENCIÓN: Si habla español, tiene a adam disposición servicios gratuitos de asistencia lingüística. Rosa al [phone].    We comply with applicable federal civil rights laws and [place] laws. We do not discriminate on the basis of race, color, national origin, age, disability, sex, sexual orientation, or gender identity.               Review of your medicines      UNREVIEWED medicines. Ask your doctor about these medicines        Dose / Directions    Multi-vitamin Tabs tablet        Dose:  1 tablet   Take 1 tablet by mouth daily   Refills:  0                Protect others around you: Learn how to safely use, store and throw away your medicines at www.disposemymeds.org.             Medication List: This is a list of all your medications and when to take them. Check marks below indicate your daily home schedule. Keep this list as a reference.      Medications           Morning Afternoon Evening Bedtime As Needed    Multi-vitamin Tabs tablet   Take 1 tablet by mouth daily

## 2017-12-18 NOTE — DISCHARGE INSTRUCTIONS
Allogeneic Donor Collection:  Sometimes following the procedure, your blood platelet count may be low.  If you are told your platelet count is low, you need to avoid taking aspirin/aspirin containing products and avoid heavy physical activity and activities that may result in bruising or traumatic injury.  Related donors will receive a call from a Bone Marrow Transplant Coordinator who will tell you if you need to return for an additional collection.  Remember to follow a low-fat diet if you will need another collection.  To contact the BMT fellow or attending physician after 5 p.m. call [phone].    Apheresis Blood Donor Center Post Instructions  You may feel tired after your procedure today.   Please call your doctor if you have:  bleeding that doesn t stop, fever, pain where a needle or tube (catheter) was placed, seizures, trouble breathing, red urine, nausea or vomiting, other health concerns.     If your symptoms are severe, call [phone].  If your veins were used, keep the bandages on for 2-4 hours.  Avoid heavy lifting with your arms.  If bleeding occurs from these sites, apply firm pressure for 5-10 minutes.  Call your physician if bleeding continues.    The Apheresis/Blood Donor Center is open Monday-Friday 7:30 a.m. to 5 p.m.  The phone number is [phone].  A Transfusion Medicine physician can be reached after 5:00 p.m. weekdays and on weekends /Holidays by calling [phone], and asking for the physician on call.

## 2017-12-18 NOTE — MR AVS SNAPSHOT
After Visit Summary   12/18/2017    Rob Downs    MRN: [id number]           Patient Information     Date Of Birth          1995        Visit Information        Provider Department      12/18/2017 2:30 PM  BMT EPIFANIO #4 [facility] Blood and Marrow Transplant        Today's Diagnoses     Donor of stem cell    -  1          Cl[facility] and Surgery Center ([facility])  [address] [place] [address]  Phone: [phone]  Clinic Hours:   Monday-Thursday:7am to 7pm   Friday: 7am to 5pm   Weekends and holidays:    8am to noon (in general)  If your fever is 100.5  or greater,   call the clinic.  After hours call the   hospital at [phone] or   [phone]. Ask for the BMT   fellow on-call            Follow-ups after your visit        Your next 10 appointments already scheduled     Dec 18, 2017  2:30 PM CST   Unrelated Donor Visit with  BMT EPIFANIO #4   [facility] Blood and Marrow Transplant ([facility])    [address] [address] [phone]              Who to contact     If you have questions or need follow up information about today's clinic visit or your schedule please contact [facility] BLOOD AND MARROW TRANSPLANT directly at [phone].  Normal or non-critical lab and imaging results will be communicated to you by [employer], letter or phone within 4 business days after the clinic has received the results. If you do not hear from us within 7 days, please contact the clinic through [employer] or phone. If you have a critical or abnormal lab result, we will notify you by phone as soon as possible.  Submit refill requests through [employer] or call your pharmacy and they will forward the refill request to us. Please allow 3 business days for your refill to be completed.          Additional Information About Your Visit        [employer] Information     [employer] lets you send messages to your doctor, view your test results, renew your  "prescriptions, schedule appointments and more. To sign up, go to [email or web address] . Click on \"Log in\" on the left side of the screen, which will take you to the Welcome page. Then click on \"Sign up Now\" on the right side of the page.     You will be asked to enter the access code listed below, as well as some personal information. Please follow the directions to create your username and password.     Your access code is: [email or web address]  Expires: 2018  6:30 AM     Your access code will  in 90 days. If you need help or a new code, please call your [facility] clinic or [phone].        Care EveryWhere ID     This is your Care EveryWhere ID. This could be used by other organizations to access your [facility] medical records  [id number]         Blood Pressure from Last 3 Encounters:   17 107/52   17 133/71   17 108/66    Weight from Last 3 Encounters:   17 60.4 kg (133 lb 2.5 oz)   17 61.6 kg (135 lb 12.8 oz)   17 60.5 kg (133 lb 6.1 oz)              Today, you had the following     No orders found for display       Recent Review Flowsheet Data     BMT Recent Results Latest Ref Rng & Units 2017    WBC 4.0 - 11.0 10e9/L 4.2 27.9(H)    Hemoglobin 13.3 - 17.7 g/dL 15.6 15.0    Platelet Count 150 - 450 10e9/L 236 194    Neutrophils (Absolute) 1.6 - 8.3 10e9/L 2.3 23.5(H)    INR 0.86 - 1.14 1.15(H) -    Sodium 133 - 144 mmol/L 139 -    Potassium 3.4 - 5.3 mmol/L 4.0 -    Chloride 94 - 109 mmol/L 104 -    Glucose 70 - 99 mg/dL 70 -    Urea Nitrogen 7 - 30 mg/dL 17 -    Creatinine 0.66 - 1.25 mg/dL 0.74 -    Calcium (Total) 8.5 - 10.1 mg/dL 9.2 -    Protein (Total) 6.8 - 8.8 g/dL 7.3 -    Albumin 3.4 - 5.0 g/dL 3.9 -    Alkaline Phosphatase 40 - 150 U/L 85 -    AST 0 - 45 U/L 22 -    ALT 0 - 70 U/L 22 -    MCV 78 - 100 fl 95 96               Primary Care Provider Fax #    Physician No Ref-Primary [phone]       No address on file        Equal Access to " Services     [facility]: Hadii aad ku hadrauldwight Bhagat, wajayada luqadaha, qaybta kaalmaadolfo gao, liliya hoffman. So [facility] [phone].    ATENCIÓN: Si habla español, tiene a adam disposición servicios gratuitos de asistencia lingüística. Llame al [phone].    We comply with applicable federal civil rights laws and [place] laws. We do not discriminate on the basis of race, color, national origin, age, disability, sex, sexual orientation, or gender identity.            Thank you!     Thank you for choosing [facility] BLOOD AND MARROW TRANSPLANT  for your care. Our goal is always to provide you with excellent care. Hearing back from our patients is one way we can continue to improve our services. Please take a few minutes to complete the written survey that you may receive in the mail after your visit with us. Thank you!             Your Updated Medication List - Protect others around you: Learn how to safely use, store and throw away your medicines at www.disposemymeds.org.          This list is accurate as of: 12/18/17  1:58 PM.  Always use your most recent med list.                   Brand Name Dispense Instructions for use Diagnosis    Multi-vitamin Tabs tablet      Take 1 tablet by mouth daily

## 2017-12-18 NOTE — PROCEDURES
Laboratory Medicine and Pathology  Transfusion Medicine - Apheresis Procedure: Peripheral Hematopoietic Stem Cell Collection    Rob Downs MRN# [id number]   YOB: 1995 Age: 21 year old        Proceduret: DOD/[facility] Hematopoietic Stem Cell Donor           Assessment and Plan:   Mr. Downs is 21 year old who presents for a  peripheral hematopoietic stem cell collection for the [employer] of [employer] Marrow Donor Program (DODMDP) after being identified through the National Marrow Donor Program (NMDP).  The plan is to collect for 1 day following a dose of filgastrim to the WBC count.    Peripheral access was used. The patient complained of minor discomfort during the procedure, but a shift in position resolved the pain.  He tolerated the remainder of the collection.        Chief Complaint:   Nervous about the collection          History of Present Illness:   This 21 year old male donor candidate reports being in a good state of health with no current complaints.  His past medical history is unremarkable other than removal of thyroglossal duct cyst. He exercises regularly and specifically denies any back or joint pain that would prevent him from tolerating the procedure.  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  He did confirm that he is homosexual and male-male sex was discussed as a potential donation risk factor. The transplant center (DOD coordinator Alison Gore) was aware of this risk factor before sending him for consultation. This information was also given by us to the BMT team (Dr. Benavidez and Rochelle Cleaning)    The procedure, risks/benefits were discussed with the patient and all of his questions were answered.             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           Allergies:   None, although he does have concerns of possible reactions to morphine in family members.           Medications:   None.               Review of Systems:    General: Denies fever, chills, fatigue, weakness.  Head: Negative for headache  Eyes: Negative for blurring of vision.  Ears: Denies hearing loss or infection.  Throat: Negative for pain.  Respiratory: No cough or shortness of breath.  Cardiac: No chest pain or palpitations.   GI: No nausea, vomiting, diarrhea, or constipation.  Urinary: No dysuria, hematuria.  Musculoskeletal: No joint or back pain other that occasional leg soreness from running.  Neuro: Negative for seizures.  Hematologic: Denies easy bruising or bleeding.  Endocrine: Negative for heat, cold intolerance.  Skin: No itching, rashes, or lesions.          Data:     Pertinent lab values have been reviewed.    ATTESTATION STATEMENT:   This patient was directly seen and evaluated by me , Alayna Choe MD, PhD.      Alayna Choe MD, PhD  Transfusion Medicine Attending  Medical Director, Blood Bank Laboratory  Pager [phone]

## 2018-10-29 ENCOUNTER — MEDICAL CORRESPONDENCE (OUTPATIENT)
Dept: HEALTH INFORMATION MANAGEMENT | Facility: CLINIC | Age: 23
End: 2018-10-29

## 2018-10-29 ENCOUNTER — TRANSFERRED RECORDS (OUTPATIENT)
Dept: HEALTH INFORMATION MANAGEMENT | Facility: CLINIC | Age: 23
End: 2018-10-29

## 2018-10-30 ENCOUNTER — PRE VISIT (OUTPATIENT)
Dept: UROLOGY | Facility: CLINIC | Age: 23
End: 2018-10-30

## 2018-10-30 NOTE — TELEPHONE ENCOUNTER
MEDICAL RECORDS REQUEST   [facility]  Urology Clinic  [address] [place] [address]  PHONE: [phone]  Fax: [phone]        FUTURE VISIT INFORMATION                                                   Rob Downs, : 1995 scheduled for future visit at [facility]    APPOINTMENT INFORMATION:    Date: 2018    Provider:  Андрей Jackman    Reason for Visit/Diagnosis: ED    REFERRAL INFORMATION:    Referring provider:  self    Specialty: self    Referring providers clinic:  self    Clinic contact number:  self    RECORDS REQUESTED FOR VISIT                                                     NOTES  STATUS/DETAILS   OFFICE NOTE from referring provider  no   OFFICE NOTE from other specialist  no   DISCHARGE SUMMARY from hospital  no   DISCHARGE REPORT from the ER  no   OPERATIVE REPORT  yeso   MEDICATION LIST  yes       PRE-VISIT CHECKLIST      Record collection complete Yes   Appointment appropriately scheduled           (right time/right provider) Yes   MyChart activation If no, please explain in process   Questionnaire complete If no, please explain in process     Completed by: Brittney Yi

## 2018-11-23 ENCOUNTER — PRE VISIT (OUTPATIENT)
Dept: UROLOGY | Facility: CLINIC | Age: 23
End: 2018-11-23

## 2018-11-23 NOTE — TELEPHONE ENCOUNTER
Patient with history of ED coming in for consult. Patient chart reviewed, no need for call, all records available and ready for appointment.

## 2018-11-30 ENCOUNTER — OFFICE VISIT (OUTPATIENT)
Dept: UROLOGY | Facility: CLINIC | Age: 23
End: 2018-11-30
Payer: COMMERCIAL

## 2018-11-30 VITALS
WEIGHT: 136.1 LBS | HEIGHT: 69 IN | HEART RATE: 62 BPM | SYSTOLIC BLOOD PRESSURE: 131 MMHG | BODY MASS INDEX: 20.16 KG/M2 | DIASTOLIC BLOOD PRESSURE: 81 MMHG

## 2018-11-30 DIAGNOSIS — N52.1 ERECTILE DYSFUNCTION DUE TO DISEASES CLASSIFIED ELSEWHERE: Primary | ICD-10-CM

## 2018-11-30 RX ORDER — SILDENAFIL CITRATE 20 MG/1
TABLET ORAL
Qty: 50 TABLET | Refills: 6 | Status: SHIPPED | OUTPATIENT
Start: 2018-11-30

## 2018-11-30 RX ORDER — TADALAFIL 5 MG/1
5 TABLET ORAL EVERY 24 HOURS
Qty: 90 TABLET | Refills: 3 | Status: SHIPPED | OUTPATIENT
Start: 2018-11-30

## 2018-11-30 ASSESSMENT — PAIN SCALES - GENERAL: PAINLEVEL: NO PAIN (0)

## 2018-11-30 NOTE — PROGRESS NOTES
"  Name: Rob Downs    MRN: [id number]   YOB: 1995                 Chief Complaint:   ED          History of Present Illness:   Mr. Rob Dowsn is a 22 year old male seen in consultation today.  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  He started taking Finasteride (propecia) in March 2018  He has tried viagra 20 and 25mg PRN.  He is not interested in stopping finasteride.  He feels viagra works well.  He has decreased spontaneous erections since his ED started           Past Medical History:     Past Medical History:   Diagnosis Date     Alopecia             Past Surgical History:     Past Surgical History:   Procedure Laterality Date     bone marrow donation              Social History:     Social History   Substance Use Topics     Smoking status: Never Smoker     Smokeless tobacco: Never Used     Alcohol use Not on file            Family History:   History reviewed. No pertinent family history.           Allergies:   No Known Allergies         Medications:     Current Outpatient Prescriptions   Medication Sig     Emtricitabine-Tenofovir DF (TRUVADA PO)      FINASTERIDE PO Take 5 mg by mouth daily     multivitamin, therapeutic with minerals (MULTI-VITAMIN) TABS tablet Take 1 tablet by mouth daily     No current facility-administered medications for this visit.              Review of Systems:    ROS: 14 point ROS neg other than the symptoms noted above in the HPI and PMH.          Physical Exam:   B/P: 131/81, T: Data Unavailable, P: 62, R: Data Unavailable  Estimated body mass index is 20.1 kg/(m^2) as calculated from the following:    Height as of this encounter: 1.753 m (5' 9\").    Weight as of this encounter: 61.7 kg (136 lb 1.6 oz).  General: age-appropriate appearing male in NAD. normal body habitus.  HEENT: Head AT/NC, EOMI, CN Grossly intact  Resp: no respiratory distress, lung sounds clear.  CV: heart rate regular, S1, S2.  Lymph: No cervical, " supraclavicular or axillary lymphadenopathy  Back: bony spine is non-tender, flanks are nontender  Abdomen: no obesity , soft, non-distended, non-tender. No organomegaly.   : testicles normal without atrophy or masses and penis normal without urethral discharge;  LE: no edema.   Neuro: grossly intact  Motor: excellent strength throughout  Skin: clear of rashes or ecchymoses.            Assessment and Plan:   22 year old male with ED. Viagra works well for him.  He also is considering Cialis daily given his frequency of sexual encounters.    -I discussed stopping finasteride given symptoms but he is not interested. I discussed post finasteride syndrome. He wants to continue for now  -Hard copy of Cialis rx 90 pills 5mg with refills.  -Viagra 20mg rx 50 pills with refills.    Андрей Jackman MD  November 30, 2018

## 2018-11-30 NOTE — NURSING NOTE
"Chief Complaint   Patient presents with     Consult     ED       Blood pressure 131/81, pulse 62, height 1.753 m (5' 9\"), weight 61.7 kg (136 lb 1.6 oz). Body mass index is 20.1 kg/(m^2).    Patient Active Problem List   Diagnosis     Donor of stem cell       No Known Allergies    Current Outpatient Prescriptions   Medication Sig Dispense Refill     Emtricitabine-Tenofovir DF (TRUVADA PO)        FINASTERIDE PO Take 5 mg by mouth daily       multivitamin, therapeutic with minerals (MULTI-VITAMIN) TABS tablet Take 1 tablet by mouth daily         Social History   Substance Use Topics     Smoking status: Never Smoker     Smokeless tobacco: Never Used     Alcohol use Not on file       Crys Mackey LPN  11/30/2018  12:55 PM    "

## 2018-11-30 NOTE — MR AVS SNAPSHOT
"              After Visit Summary   2018    Rob Downs    MRN: [id number]           Patient Information     Date Of Birth          1995        Visit Information        Provider Department      2018 1:00 PM Андрей Jackman MD [facility]        Today's Diagnoses     Erectile dysfunction due to diseases classified elsewhere    -  1       Follow-ups after your visit        Who to contact     Please call your clinic at [phone] to:    Ask questions about your health    Make or cancel appointments    Discuss your medicines    Learn about your test results    Speak to your doctor            Additional Information About Your Visit        MyChart Information     [employer] is an electronic gateway that provides easy, online access to your medical records. With [employer], you can request a clinic appointment, read your test results, renew a prescription or communicate with your care team.     To sign up for [employer] visit the website at [email or web address]   You will be asked to enter the access code listed below, as well as some personal information. Please follow the directions to create your username and password.     Your access code is: [email or web address]  Expires: 2019  6:30 AM     Your access code will  in 90 days. If you need help or a new code, please contact your [facility] or call [phone] for assistance.        Care EveryWhere ID     This is your Care EveryWhere ID. This could be used by other organizations to access your [facility] medical records  [id number]        Your Vitals Were     Pulse Height BMI (Body Mass Index)             62 1.753 m (5' 9\") 20.1 kg/m2          Blood Pressure from Last 3 Encounters:   18 131/81   17 123/61   17 133/71    Weight from Last 3 Encounters:   18 61.7 kg (136 lb 1.6 oz)   17 60.4 kg (133 lb 2.5 oz)   17 61.6 kg (135 lb 12.8 " oz)              Today, you had the following     No orders found for display         Today's Medication Changes          These changes are accurate as of 11/30/18  1:39 PM.  If you have any questions, ask your nurse or doctor.               Start taking these medicines.        Dose/Directions    sildenafil 20 MG tablet   Commonly known as:  REVATIO   Used for:  Erectile dysfunction due to diseases classified elsewhere   Started by:  Андрей Jackman MD        Take 1, 2, 3, 4, or 5 pills as needed for erectile dysfunction.   Quantity:  50 tablet   Refills:  6       tadalafil 5 MG tablet   Commonly known as:  CIALIS   Used for:  Erectile dysfunction due to diseases classified elsewhere   Started by:  Андрей Jackman MD        Dose:  5 mg   Take 1 tablet (5 mg) by mouth every 24 hours   Quantity:  90 tablet   Refills:  3            Where to get your medicines      Some of these will need a paper prescription and others can be bought over the counter.  Ask your nurse if you have questions.     Bring a paper prescription for each of these medications     sildenafil 20 MG tablet    tadalafil 5 MG tablet                Primary Care Provider Fax #    Physician No Ref-Primary [phone]       No address on file        Equal Access to Services     DESMOND FIGUEROA : Haddelma bookero Somegha, waaxda luqadaha, qaybta kaalmada adelorneyaadolfo, liliya loo . So [facility] [phone].    ATENCIÓN: Si habla español, tiene a adam disposición servicios gratuitos de asistencia lingüística. Llame al [phone].    We comply with applicable federal civil rights laws and [place] laws. We do not discriminate on the basis of race, color, national origin, age, disability, sex, sexual orientation, or gender identity.            Thank you!     Thank you for choosing [facility] AND [facility] PROST[facility] UROLOGIC [facility]CERS  for your care. Our goal is always to provide you with excellent care. Hearing back from our  patients is one way we can continue to improve our services. Please take a few minutes to complete the written survey that you may receive in the mail after your visit with us. Thank you!             Your Updated Medication List - Protect others around you: Learn how to safely use, store and throw away your medicines at www.disposemymeds.org.          This list is accurate as of 11/30/18  1:39 PM.  Always use your most recent med list.                   Brand Name Dispense Instructions for use Diagnosis    FINASTERIDE PO      Take 5 mg by mouth daily        Multi-vitamin tablet      Take 1 tablet by mouth daily        sildenafil 20 MG tablet    REVATIO    50 tablet    Take 1, 2, 3, 4, or 5 pills as needed for erectile dysfunction.    Erectile dysfunction due to diseases classified elsewhere       tadalafil 5 MG tablet    CIALIS    90 tablet    Take 1 tablet (5 mg) by mouth every 24 hours    Erectile dysfunction due to diseases classified elsewhere       TRUVADA PO

## 2018-11-30 NOTE — LETTER
11/30/2018       RE: Rob Downs  [address] [address]     Dear Colleague,    Thank you for referring your patient, Rob Downs, to the [facility] AND [facility] FOR PROST[facility] AND UROLOGIC CANCERS at [facility]. Please see a copy of my visit note below.      Name: Rob Downs    MRN: [id number]   YOB: 1995                 Chief Complaint:   ED          History of Present Illness:   Mr. Rob Downs is a 22 year old male seen in consultation today.  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  He started taking Finasteride (propecia) in March 2018  He has tried viagra 20 and 25mg PRN.  He is not interested in stopping finasteride.  He feels viagra works well.  He has decreased spontaneous erections since his ED started           Past Medical History:     Past Medical History:   Diagnosis Date     Alopecia             Past Surgical History:     Past Surgical History:   Procedure Laterality Date     bone marrow donation              Social History:     Social History   Substance Use Topics     Smoking status: Never Smoker     Smokeless tobacco: Never Used     Alcohol use Not on file            Family History:   History reviewed. No pertinent family history.           Allergies:   No Known Allergies         Medications:     Current Outpatient Prescriptions   Medication Sig     Emtricitabine-Tenofovir DF (TRUVADA PO)      FINASTERIDE PO Take 5 mg by mouth daily     multivitamin, therapeutic with minerals (MULTI-VITAMIN) TABS tablet Take 1 tablet by mouth daily     No current facility-administered medications for this visit.              Review of Systems:    ROS: 14 point ROS neg other than the symptoms noted above in the HPI and PMH.          Physical Exam:   B/P: 131/81, T: Data Unavailable, P: 62, R: Data Unavailable  Estimated body mass index is 20.1 kg/(m^2) as calculated from the following:    Height as  "of this encounter: 1.753 m (5' 9\").    Weight as of this encounter: 61.7 kg (136 lb 1.6 oz).  General: age-appropriate appearing male in NAD. normal body habitus.  HEENT: Head AT/NC, EOMI, CN Grossly intact  Resp: no respiratory distress, lung sounds clear.  CV: heart rate regular, S1, S2.  Lymph: No cervical, supraclavicular or axillary lymphadenopathy  Back: bony spine is non-tender, flanks are nontender  Abdomen: no obesity , soft, non-distended, non-tender. No organomegaly.   : testicles normal without atrophy or masses and penis normal without urethral discharge;  LE: no edema.   Neuro: grossly intact  Motor: excellent strength throughout  Skin: clear of rashes or ecchymoses.            Assessment and Plan:   22 year old male with ED. Viagra works well for him.  He also is considering Cialis daily given his frequency of sexual encounters.    -I discussed stopping finasteride given symptoms but he is not interested. I discussed post finasteride syndrome. He wants to continue for now  -Hard copy of Cialis rx 90 pills 5mg with refills.  -Viagra 20mg rx 50 pills with refills.    Андрей Jackman MD  November 30, 2018     "

## 2019-04-18 ENCOUNTER — MYC MEDICAL ADVICE (OUTPATIENT)
Dept: UROLOGY | Facility: CLINIC | Age: 24
End: 2019-04-18

## 2019-04-23 DIAGNOSIS — N52.9 ED (ERECTILE DYSFUNCTION): Primary | ICD-10-CM

## 2019-04-23 RX ORDER — TADALAFIL 20 MG/1
20 TABLET ORAL DAILY
Qty: 90 TABLET | Refills: 3 | Status: SHIPPED | OUTPATIENT
Start: 2019-04-23 | End: 2019-07-22

## 2019-05-04 ENCOUNTER — NURSE TRIAGE (OUTPATIENT)
Dept: NURSING | Facility: CLINIC | Age: 24
End: 2019-05-04

## 2020-03-11 ENCOUNTER — HEALTH MAINTENANCE LETTER (OUTPATIENT)
Age: 25
End: 2020-03-11

## 2020-12-27 ENCOUNTER — HEALTH MAINTENANCE LETTER (OUTPATIENT)
Age: 25
End: 2020-12-27

## 2021-04-25 ENCOUNTER — HEALTH MAINTENANCE LETTER (OUTPATIENT)
Age: 26
End: 2021-04-25

## 2021-10-09 ENCOUNTER — HEALTH MAINTENANCE LETTER (OUTPATIENT)
Age: 26
End: 2021-10-09

## 2022-05-21 ENCOUNTER — HEALTH MAINTENANCE LETTER (OUTPATIENT)
Age: 27
End: 2022-05-21

## 2022-09-17 ENCOUNTER — HEALTH MAINTENANCE LETTER (OUTPATIENT)
Age: 27
End: 2022-09-17

## 2023-06-04 ENCOUNTER — HEALTH MAINTENANCE LETTER (OUTPATIENT)
Age: 28
End: 2023-06-04

## (undated) RX ORDER — ACETAMINOPHEN 325 MG/1
TABLET ORAL
Status: DISPENSED
Start: 2017-12-18

## (undated) RX ORDER — CALCIUM GLUCONATE 94 MG/ML
INJECTION, SOLUTION INTRAVENOUS
Status: DISPENSED
Start: 2017-12-18